# Patient Record
Sex: FEMALE
[De-identification: names, ages, dates, MRNs, and addresses within clinical notes are randomized per-mention and may not be internally consistent; named-entity substitution may affect disease eponyms.]

---

## 2017-08-25 ENCOUNTER — HOSPITAL ENCOUNTER (INPATIENT)
Dept: HOSPITAL 31 - C.EROB | Age: 32
LOS: 2 days | Discharge: HOME | End: 2017-08-27
Attending: OBSTETRICS & GYNECOLOGY | Admitting: OBSTETRICS & GYNECOLOGY
Payer: COMMERCIAL

## 2017-08-25 DIAGNOSIS — Z3A.38: ICD-10-CM

## 2017-08-25 LAB
ALBUMIN/GLOB SERPL: 0.9 {RATIO} (ref 1–2.1)
ALP SERPL-CCNC: 202 U/L (ref 38–126)
ALT SERPL-CCNC: 22 U/L (ref 9–52)
AST SERPL-CCNC: 23 U/L (ref 14–36)
BACTERIA #/AREA URNS HPF: (no result) /[HPF]
BASOPHILS # BLD AUTO: 0.1 K/UL (ref 0–0.2)
BASOPHILS NFR BLD: 1.2 % (ref 0–2)
BILIRUB SERPL-MCNC: 0.4 MG/DL (ref 0.2–1.3)
BILIRUB UR-MCNC: NEGATIVE MG/DL
BUN SERPL-MCNC: 7 MG/DL (ref 7–17)
CALCIUM SERPL-MCNC: 8.7 MG/DL (ref 8.6–10.4)
CHLORIDE SERPL-SCNC: 105 MMOL/L (ref 98–107)
CO2 SERPL-SCNC: 20 MMOL/L (ref 22–30)
EOSINOPHIL # BLD AUTO: 0.1 K/UL (ref 0–0.7)
EOSINOPHIL NFR BLD: 1.2 % (ref 0–4)
ERYTHROCYTE [DISTWIDTH] IN BLOOD BY AUTOMATED COUNT: 16 % (ref 11.5–14.5)
GLOBULIN SER-MCNC: 3.4 GM/DL (ref 2.2–3.9)
GLUCOSE SERPL-MCNC: 79 MG/DL (ref 65–105)
GLUCOSE UR STRIP-MCNC: NORMAL MG/DL
HCT VFR BLD CALC: 35.5 % (ref 34–47)
KETONES UR STRIP-MCNC: NEGATIVE MG/DL
LEUKOCYTE ESTERASE UR-ACNC: (no result) LEU/UL
LYMPHOCYTES # BLD AUTO: 2.1 K/UL (ref 1–4.3)
LYMPHOCYTES NFR BLD AUTO: 32.7 % (ref 20–40)
MCH RBC QN AUTO: 26 PG (ref 27–31)
MCHC RBC AUTO-ENTMCNC: 32.9 G/DL (ref 33–37)
MCV RBC AUTO: 79.1 FL (ref 81–99)
MONOCYTES # BLD: 0.5 K/UL (ref 0–0.8)
MONOCYTES NFR BLD: 8.3 % (ref 0–10)
NRBC BLD AUTO-RTO: 0.1 % (ref 0–2)
PH UR STRIP: 6 [PH] (ref 5–8)
PLATELET # BLD: 223 K/UL (ref 130–400)
PMV BLD AUTO: 9 FL (ref 7.2–11.7)
POTASSIUM SERPL-SCNC: 3.7 MMOL/L (ref 3.6–5.2)
PROT SERPL-MCNC: 6.6 G/DL (ref 6.3–8.3)
PROT UR STRIP-MCNC: NEGATIVE MG/DL
RBC # UR STRIP: NEGATIVE /UL
RBC #/AREA URNS HPF: 2 /HPF (ref 0–3)
SODIUM SERPL-SCNC: 139 MMOL/L (ref 132–148)
SP GR UR STRIP: 1.02 (ref 1–1.03)
UROBILINOGEN UR-MCNC: NORMAL MG/DL (ref 0.2–1)
WBC # BLD AUTO: 6.5 K/UL (ref 4.8–10.8)
WBC #/AREA URNS HPF: 10 /HPF (ref 0–5)

## 2017-08-25 NOTE — OBADHP
===========================

Datetime: 2017 09:18

===========================

   

Admit Comment, IP Provider:   @ 38.4 wks c/o of ctx eveyr 5 min since last night, incresaing i
ntensity and frequency 7/10, deies lof, vb, +FM

   Ante: 10Cm+ ovairan cyst, resovled 

   OB:  x 1 FT, SAB x 1

   GYN: hx of ovarian cyst, denie shx o abnorla pap, dibroid, sti

   PMHY: deies

   PSH: denies

   FHX: non contributory

   SHX: negatiev x 3

   MEDS: PNV

      

   A/P  @ 38.4 wks in active labor, gbs negative

   -admit to ld

   -anethesia consult prn

   -cont toco and efm

   -npo, ivf

Pelvic Type - PN:  Adequate

Extremities - PN:  Normal

Abdomen - PN:  Normal

Back - PN:  Normal

Breast - PN:  Normal

Lungs - PN:  Normal

Heart - PN:  Normal

Thyroid - PN:  Normal

Neurologic - PN:  Normal

HEENT - PN:  Normal

General - PN:  Normal

Fetal Presentation-Admit:  Vertex

FHR - Baseline A Provider:  140

Membranes, Provider:  Intact

Contraction Comments Provider:  q 2 min

Gestation - Est Wks by US:  38.4

IP Prenatal Hx Assessment:  The Prenatal History has been Reviewed and is Current

IP Chief Complaint:  Uterine contractions

NICHD Variability Prov Fetus A:  Moderate 6-25bpm

NICHD Accel Fetus A IP Provider:  [id number]

FHR Category Provider Fetus A:  Category I

NICHD Decel Fetus A IP Provider:  None

Dilatation, Provider:  4

Effacement, Provider:  50

Station, Provider:  -2

Genitourinary Exam:  Normal

DTRs - PN:  Normal

EGA AdmitDate IP:  38.4

IP Adm Impression:  Term, intrauterine pregnancy

IP Admit Plan:  Admit to unit

## 2017-08-25 NOTE — OBDS
===================================

DELIVERY PERSONNEL

===================================

   

Delivery Doctor:  ANGELITO Mejia MD

Scrub Nurse:  Debby Sam

Circulator:  Loni Oneil RN

Anesthesiologist:  

   

===================================

MATERNAL INFORMATION

===================================

   

Delivery Anesthesia:  Epidural

Estimated  Blood Loss (ml):  200

Maternal Complications:  None

Provider Comments:  pt was fully dilated and pushing. atrumatic, spontaneous delivery of head in ana 
position. body cord x 1 reduced. atruamtic, spontenous delivery of anterior followed by posterior latisha
ulder followed by deliver of body. both oral and nasal passages of the baby were bulb suctioned. umbi
lcal cord was clamped and cut. baby handed to mother on abomen with rn assistance. cord blood and cor
d gases collected and sent x 2. Spontansous delivery of intact placenta with membranes.  Fundus firm.
  Good hemostasis, intact perineum. No complicatoins. 

      

   live female infant 

   apgars 9,9

   weight of 6lbs 12 ounces

   ebl 200ml

   no complicatoins

   

===================================

LABOR SUMMARY

===================================

   

EDC:  2017 00:00

No. Babies in Womb:  1

 Attempted:  No

Labor Anesthesia:  Epidural

   

===================================

LABOR INFORMATION

===================================

   

Reason for Induction:  Not Applicable

Cervical Ripening Agents:  Cytotec @ (Annotations: 50 po)

Oxytocin:  Augmentation

Group B Beta Strep:  Negative

Antibiotics # of Doses:  0

Antibiotics Time of Last Dose:  0

Steroids Given:  None

Reason Steroids Not Administered:  Not Applicable

   

===================================

MEMBRANES

===================================

   

Amniotic Fluid Color:  Clear

Amniotic Fluid Amount:  Moderate

Amniotic Fluid Odor:  Normal

   

===================================

STAGES OF LABOR

===================================

   

Stage 3 hrs:  0

Stage 3 min:  3

   

===================================

VAGINAL DELIVERY

===================================

   

Episiotomy:  None

Laceration Extension:  N/A

Laceration Type:  None

Laceration Repair Note:  intact perineum

Initial Vag Sponge Count:  10

Final Vag Sponge Count:  10

Initial Vag Sharps Count:  0

Final Vag Sharps Count:  0

Sponge Count Correct:  Yes; Vaginal Sweep Performed

Sharps Count Correct:  N/A

   

===================================

BABY A INFORMATION

===================================

   

Infant Delivery Date/Time:  2017 20:47

Method of Delivery:  Vaginal

Born in Route :  No

:  N/A

Forceps:  N/A

Vacuum Extraction:  N/A

Shoulder Dystocia :  No

   

===================================

SHOULDER DYSTOCIA BABY A

===================================

   

Infant Delivery Date/Time:  2017 20:47

   

===================================

PRESENTATION/POSITION BABY A

===================================

   

Presentation:  Cephalic

Cephalic Presentation:  Vertex

Vertex Position:  Right Occipital Anterior

   

===================================

PLACENTA INFORMATION BABY A

===================================

   

Placenta Delivery Time :  2017 20:50

Placenta Method of Delivery:  Spontaneous

Placenta Status:  Delivered

   

===================================

APGAR SCORES BABY A

===================================

   

Heart Rate 1 min:  >100 bpm

Resp Effort 1 min:  Good Cry

Reflex Irritability 1 min:  Cough or Sneeze or Pulls Away

Muscle Tone 1 min:  Active Motion

Color 1 min:  Body Pink, Extremities Blue

APGAR SCORE 1 MIN:  9

Heart Rate 5 min:  >100 bpm

Resp Effort 5 min:  Good Cry

Reflex Irritability 5 min:  Cough or Sneeze or Pulls Away

Muscle Tone 5 min:  Active Motion

Color 5 min:  Body Pink, Extremities Blue

APGAR SCORE 5 MIN:  9

   

===================================

INFANT INFORMATION BABY A

===================================

   

Gestational Age at Delivery:  38.4

Gestational Status:  Term

Infant Outcome :  Liveborn

Infant Condition :  Stable

Infant Sex:  Female

   

===================================

IDENTIFICATION/MEDS BABY A

===================================

   

ID Band Number:  [id number]

ID Band Location:  Left Leg; Left Arm



Sensor Applied:  Yes

Sensor Number:  [id number]

Sensor Location :  Cord Clamp

Vitamin K Given :  Aquamephyton 1 mg IM; Left Thigh

Erythromycin Given:  Given Both Eyes

   

===================================

WEIGHT/LENGTH BABY A

===================================

   

Infant Birthweight (gms):  3055

Infant Weight (lb):  6

Infant Weight (oz):  12

Infant Length Inches:  19.00

Infant Length cms:  48.3

   

===================================

CORD INFORMATION BABY A

===================================

   

No. Cord Vessels:  3

Nuchal Cord :  N/A

   

===================================

ASSESSMENT BABY A

===================================

   

Infant Complications:  None

Physical Findings at Delivery:  Within Normal Limits

Infant Respirations:  Appears Normal

Neonatologist/ALS Called :  No

Infant Care By:  LUKAS

Transferred To:  Remains with Mother

## 2017-08-25 NOTE — OBPN
===========================

Datetime: 2017 14:47

===========================

   

IP Progress Impression:  Normal progression of labor

IP Progress Plan:  Continue present management

Membranes, Provider:  Intact

Contraction Comments Provider:  q 1-2 

FHR - Baseline A Provider:  125

Gestation - Est Wks by US:  38.4

Fetal Presentation-Admit:  Vertex

IP Progress Note Comment:  pt seen and examien dc/o of pain s/p epidural

   latrice eslof, vb, +FM

      

   VSS

   EMF: Cat I

   GERONIMO: q 1-2 min

      

   A/p  @ 38.4 wks GA in labor

   pitoicn as per protocol for augmentation

   cont curret mangamnet

Vital Signs Provider:  Reviewed; Within Normal Limits

FHR Category Provider Fetus A:  Category III

NICHD Variability Prov Fetus A:  Moderate 6-25bpm

Dilatation, Provider:  5

Effacement, Provider:  60

Station, Provider:  -2

   

===========================

Datetime: 2017 09:18

===========================

   

NICHD Accel Fetus A IP Provider:  [id number]

NICHD Decel Fetus A IP Provider:  None

## 2017-08-25 NOTE — OBHP
===========================

Datetime: 2017 09:18

===========================

   

IP Adm Impression:  Term, intrauterine pregnancy

IP Admit Plan:  Admit to unit

Admit Comment, IP Provider:   @ 38.4 wks c/o of ctx eveyr 5 min since last night, incresaing i
ntensity and frequency 7/10, deies lof, vb, +FM

   Ante: 10Cm+ ovairan cyst, resovled 

   OB:  x 1 FT, SAB x 1

   GYN: hx of ovarian cyst, denie shx o abnorla pap, dibroid, sti

   PMHY: deies

   PSH: denies

   FHX: non contributory

   SHX: negatiev x 3

   MEDS: PNV

      

   A/P  @ 38.4 wks in active labor, gbs negative

   -admit to ld

   -anethesia consult prn

   -cont toco and efm

   -npo, ivf

Pelvic Type - PN:  Adequate

Extremities - PN:  Normal

Abdomen - PN:  Normal

Back - PN:  Normal

Breast - PN:  Normal

Lungs - PN:  Normal

Heart - PN:  Normal

Thyroid - PN:  Normal

Neurologic - PN:  Normal

HEENT - PN:  Normal

General - PN:  Normal

Fetal Presentation-Admit:  Vertex

FHR - Baseline A Provider:  140

Membranes, Provider:  Intact

Contraction Comments Provider:  q 2 min

Gestation - Est Wks by US:  38.4

IP Prenatal Hx Assessment:  The Prenatal History has been Reviewed and is Current

EGA AdmitDate IP:  38.4

IP Chief Complaint:  Uterine contractions

NICHD Variability Prov Fetus A:  Moderate 6-25bpm

NICHD Accel Fetus A IP Provider:  [id number]

FHR Category Provider Fetus A:  Category I

NICHD Decel Fetus A IP Provider:  None

Dilatation, Provider:  4

Effacement, Provider:  50

Station, Provider:  -2

Genitourinary Exam:  Normal

DTRs - PN:  Normal

## 2017-08-26 LAB
BASOPHILS # BLD AUTO: 0.1 K/UL (ref 0–0.2)
BASOPHILS NFR BLD: 0.6 % (ref 0–2)
EOSINOPHIL # BLD AUTO: 0.1 K/UL (ref 0–0.7)
EOSINOPHIL NFR BLD: 0.9 % (ref 0–4)
ERYTHROCYTE [DISTWIDTH] IN BLOOD BY AUTOMATED COUNT: 15.7 % (ref 11.5–14.5)
HCT VFR BLD CALC: 33.5 % (ref 34–47)
LYMPHOCYTES # BLD AUTO: 2.6 K/UL (ref 1–4.3)
LYMPHOCYTES NFR BLD AUTO: 28.7 % (ref 20–40)
MCH RBC QN AUTO: 25.8 PG (ref 27–31)
MCHC RBC AUTO-ENTMCNC: 32.2 G/DL (ref 33–37)
MCV RBC AUTO: 80.2 FL (ref 81–99)
MONOCYTES # BLD: 0.7 K/UL (ref 0–0.8)
MONOCYTES NFR BLD: 8 % (ref 0–10)
NRBC BLD AUTO-RTO: 0 % (ref 0–2)
PLATELET # BLD: 169 K/UL (ref 130–400)
PMV BLD AUTO: 9.4 FL (ref 7.2–11.7)
WBC # BLD AUTO: 8.9 K/UL (ref 4.8–10.8)

## 2017-08-26 NOTE — OBPPN
===========================

Datetime: 2017 03:53

===========================

   

PP Pain Prov:  Within normal limits

PP Nausea Prov:  Denies

PP Flatus Prov:  Yes

PP BM Prov:  No

PP Breasts Prov:  Normal

PP Heart Prov:  Normal

PP Lungs Prov:  Normal

PP Abdomen/Uterus Prov:  Normal

PP Lochia Prov:  Normal

PP Vulva/Perineum Prov:  Normal

PP CVA Tenderness Prov:  Normal

PP Extremities Prov:  Normal

PP C/S Incision Prov:  Not Applicable

PP Breastfeeding Progress Prov:  Normal

PP Impression Prov:  Normal postpartum progression

PP Plan Prov:  Continue present management

PP Progress Note Prov:  pt seen and examiend and rpeorts pain controlled iwth medication. pt is breat
 feeding, deie fevers, chills, nasuse, vomiting, CP, SOB

      

   VSS

   PE see above

      

   a/p s/p  PPD #1 doing well

   -f/u am cbc

   -cont current managmante

IP PP Procedures:  None

Vital Signs Provider PP:  Reviewed; Within Normal Limits

## 2017-08-27 VITALS — OXYGEN SATURATION: 98 % | TEMPERATURE: 98.2 F

## 2017-08-27 VITALS — HEART RATE: 71 BPM | SYSTOLIC BLOOD PRESSURE: 111 MMHG | RESPIRATION RATE: 20 BRPM | DIASTOLIC BLOOD PRESSURE: 68 MMHG

## 2017-08-27 NOTE — OBDCSUM
===========================

Datetime: 08/26/2017 17:34

===========================

   

Discharge Instructions, Provider:  Routine instructions given

Discharge Diagnosis, Provider:  Term Pregnancy Delivered

Disch Referrals:  None

Contraception discussed, Prov:  Yes

Discharge Comment, Provider:  Precautions given

Contraception after Delivery:  Not Planning to Use

## 2017-08-27 NOTE — OBPPN
===========================

Datetime: 2017 07:44

===========================

   

PP Pain Prov:  Within normal limits

PP Nausea Prov:  Denies

PP Flatus Prov:  Yes

PP Breasts Prov:  Normal

PP Heart Prov:  Normal

PP Lungs Prov:  Normal

PP Abdomen/Uterus Prov:  Normal

PP Lochia Prov:  Normal

PP Vulva/Perineum Prov:  Normal

PP CVA Tenderness Prov:  Normal

PP Extremities Prov:  Normal

PP C/S Incision Prov:  Not Applicable

PP Breastfeeding Progress Prov:  Normal

PP Impression Prov:  Normal postpartum progression

PP Plan Prov:  Discharge

PP Progress Note Prov:  Pt seen and examined and reports pain is controlled with medication. pt denie
s any fever, chills, nausea, vomiting, CP, SOB. pt is ambuting, tolerating regular diet, passing flat
us, +BM.

   VSS

   GEN: NAD, AAo x 3

   RESP: CTAB?l

   CVS: RRR, +S1/S2

   BREAST Non tender, non engorged

   ABD: soft, NT/ND, +BS, no guarding, no reobud tenderness, no rigidity

   FUNDUS: Firm, at level of umbilucs

   VE: Minimal lochia, non ful smelling

   EXT: negative vaibhav's sign

      

   A/P s/p  PPD #2 stable for d/c 

   -d;c home 

   -precautisn given

   -RTO 6 week

IP PP Procedures:  None

Vital Signs Provider PP:  Reviewed; Within Normal Limits

## 2019-04-12 ENCOUNTER — HOSPITAL ENCOUNTER (OUTPATIENT)
Dept: HOSPITAL 31 - C.PAT | Age: 34
End: 2019-04-12
Payer: COMMERCIAL

## 2019-04-12 DIAGNOSIS — Z30.2: Primary | ICD-10-CM

## 2019-04-19 ENCOUNTER — HOSPITAL ENCOUNTER (OUTPATIENT)
Dept: HOSPITAL 31 - C.SDS | Age: 34
Discharge: HOME | End: 2019-04-19
Attending: OBSTETRICS & GYNECOLOGY
Payer: COMMERCIAL

## 2019-04-19 VITALS
TEMPERATURE: 97.8 F | SYSTOLIC BLOOD PRESSURE: 102 MMHG | RESPIRATION RATE: 18 BRPM | DIASTOLIC BLOOD PRESSURE: 58 MMHG | HEART RATE: 66 BPM

## 2019-04-19 VITALS — OXYGEN SATURATION: 99 %

## 2019-04-19 VITALS — BODY MASS INDEX: 25 KG/M2

## 2019-04-19 DIAGNOSIS — Z30.2: Primary | ICD-10-CM

## 2019-04-19 PROCEDURE — 58661 LAPAROSCOPY REMOVE ADNEXA: CPT

## 2019-04-19 PROCEDURE — 88302 TISSUE EXAM BY PATHOLOGIST: CPT

## 2019-04-20 NOTE — OP
PROCEDURE DATE:  04/19/2019



PREOPERATIVE DIAGNOSIS:  Multiparity, desires permanent sterilization.



POSTOPERATIVE DIAGNOSIS:  Multiparity, desires permanent sterilization.



SURGEON:  Bibiana Mejia MD.



ASSISTANT:  CATERINA Humphrey.



TYPE OF ANESTHESIA:  General.



OPERATIVE FINDINGS:  An 8-week size anteverted uterus, normal-appearing

tubes, normal-appearing ovaries bilaterally.



Romi Jones was the surgical assistant, present for the entire case,

especially in gaining entry laparoscopically, docking the robot, inserting

the instruments, _____ uterine manipulation, removing the specimens,

closing all layers, and was present for the entire case, she was essential.



ESTIMATED BLOOD LOSS:  5 mL.



BLOOD PRODUCTS:  None.



COMPLICATIONS:  None.



SPECIMENS:  Sent to pathology right and left fallopian tubes.



DESCRIPTION OF PROCEDURE:  The patient was taken to the operating room,

where she was given general anesthesia.  Once it was found to be adequate,

she was placed on the operating table in dorsal supine position with legs

supported using stirrups in usual fashion.  A time-out was performed

confirming correct patient and correct procedure.  Bimanual exam was

performed with the above-mentioned findings.  A Rodriguez catheter was then

inserted in the ureter to drain the bladder.  Following this, bimanual exam

was performed.  A Smiley retractor was placed in the anterior and posterior

fornix of the vagina.  The cervix was adequately visualized.  A

single-tooth tenaculum was placed in the anterior lip of the cervix.  The

uterus was then sounded to 7 cm.  Following this, the cervix was

sequentially dilated to allow for introduction of the HUMI uterine

manipulator, which was then inserted and advanced and insufflated with 8 mL

of air.  The HUMI uterine manipulator was noted in place, the tenaculum was

removed with good hemostasis noted at the uterine site.  All instruments

were removed except for the HUMI uterine manipulator in the bladder.  Legs

were then repositioned.  The surgeon re-gloved.  Attention was then turned

to the abdomen.  The patient was given local anesthetic supraumbilically

and an 8-mm skin incision was made with the scalpel.  The skin was then

retracted using two S-retractors and the fascia was then entered with a

scalpel.  The laparoscope was then inserted under direct visualization. 

_____.  After the abdomen was insufflated with normal opening pressures,

the laparoscope was removed.  The patient was then placed in Trendelenburg

position.  The bowel was then retracted out of the way.  There were normal

tubes and ovaries bilaterally.  Following this, da Samantha robotic operative

device was then docked to the appropriate _____.  Two 8-mm ports were put

in the right and left lower quadrant under direct visualization.  After the

robot was docked, a monopolar scissor was placed in arm-3 and _____ bipolar

in arm-1.  The surgeon then sat at a console _____ da Samantha device and at

that point the assistant was providing anteverted traction on the HUMI

uterine manipulator to allow for good visualization.  At that point, the

right fallopian tube was grasped using the bipolar device and the

mesosalpinx was carefully dissected using the monopolar curved scissors. 

After obtaining adequate hemostasis, the fallopian tube was then cauterized

at close proximity to the cornual region and was carefully dissected away. 

Similar was done on the contralateral fallopian tube.  There was good

hemostasis noted.  At that point, the monopolar scissor was then removed

and an atraumatic grasper was inserted under direct visualization and the

specimen was removed through the port _____.  Following this, the abdomen

was desufflated.  There was good hemostasis noted.  The robot had been

undocked.  At that point, the skin incisions were closed with 3-0 Monocryl

in a running subcuticular fashion.  The HUMI uterine manipulator and the

Rodriguez had been removed.  There was good hemostasis noted.  At the end of

the procedure, all needle, sponge, and instrument counts were noted to be

correct x2.  The patient tolerated the procedure well and was transferred

to the recovery room in stable condition.







__________________________________________

Bibiana Mejia MD





DD:  04/19/2019 9:43:07

DT:  04/19/2019 16:45:48

Job # [id number]